# Patient Record
Sex: MALE | Race: BLACK OR AFRICAN AMERICAN
[De-identification: names, ages, dates, MRNs, and addresses within clinical notes are randomized per-mention and may not be internally consistent; named-entity substitution may affect disease eponyms.]

---

## 2020-10-19 ENCOUNTER — HOSPITAL ENCOUNTER (OUTPATIENT)
Dept: HOSPITAL 53 - M RAD | Age: 35
End: 2020-10-19
Attending: PLASTIC SURGERY
Payer: COMMERCIAL

## 2020-10-19 DIAGNOSIS — L90.5: Primary | ICD-10-CM

## 2020-10-19 NOTE — REP
INDICATION:

SCAR CONDITIONS AND FIBROSIS OF SKIN.



COMPARISON:

None



TECHNIQUE:

Real-time sonographic evaluation of bilateral retroareolar regions is performed.



FINDINGS:

No cystic or solid nodule is seen bilaterally in the retroareolar regions.  Soft

tissues appear unremarkable.





IMPRESSION:

BIRADS/ACR category 1.  Negative bilateral retroareolar ultrasound.



RECOMMENDATION:

Clinical correlation and follow-up recommended.



<Electronically signed by Jelani Hernández > 10/19/20 [id number]

## 2021-05-25 ENCOUNTER — HOSPITAL ENCOUNTER (OUTPATIENT)
Dept: HOSPITAL 53 - M SDC | Age: 36
Discharge: HOME | End: 2021-05-25
Attending: PLASTIC SURGERY
Payer: COMMERCIAL

## 2021-05-25 VITALS — BODY MASS INDEX: 34.96 KG/M2 | WEIGHT: 272.4 LBS | HEIGHT: 74 IN

## 2021-05-25 VITALS — SYSTOLIC BLOOD PRESSURE: 128 MMHG | DIASTOLIC BLOOD PRESSURE: 62 MMHG

## 2021-05-25 DIAGNOSIS — L90.5: Primary | ICD-10-CM

## 2021-05-25 DIAGNOSIS — I10: ICD-10-CM

## 2021-05-25 DIAGNOSIS — D64.9: ICD-10-CM

## 2021-05-25 PROCEDURE — 88302 TISSUE EXAM BY PATHOLOGIST: CPT

## 2021-05-25 PROCEDURE — 13100 CMPLX RPR TRUNK 1.1-2.5 CM: CPT

## 2021-05-25 NOTE — ROOPDOC
[facility] Report Of Operation


Report of Operation


DATE OF PROCEDURE: 5/25/21





PREOPERATIVE DIAGNOSIS: right breast periareolar scar





POSTOPERATIVE DIAGNOSIS: same





FINDINGS: Right breast periareolar scar 





PROCEDURE: Right side periareolar scar revision right breast.





SURGEON: Dr Pena





ANESTHESIA: General





SPECIMENS: Right breast scar





ESTIMATED BLOOD LOSS: 3 cc





REPLACED: none





DRAINS: none





COMPLICATIONS: none





POSTOPERATIVE CONDITION: stable





This is a 35 year-old male with symptomatic scar on the right side of 

periareolar incision on right breast,  status post subcutaneous mastectomy for 

gynecomastia. He was treated conservatively but did not achieve desired effect 

with the right breast scar. According to patient he had delayed healing with 

local infection in that area. Periareolar scar is creating a nipple contraction 

and pain, therefore he scheduled for right breast scar revision.  Risks, 

benefits, and alternatives discussed with the patient. He is ready to proceed. 


After obtaining informed consent patient brought into the operating room, placed

in supine position, perioperative antibiotics given, sequential stockings placed

in the lower calves, general anesthesia induced. He was prepped and draped in 

the usual sterile fashion.


1% lidocaine with epinephrine was infiltrated in the area of the scar on the 

right side. Incision carried out around the scar, and lateral right side of the 

nipple-areolar complex. Extensive amount of thick scar was encountered, resected

with use of electrocautery. Retracted part of the breast was released.  Scar 

tissue measures 4.5 cm in length. Wound was irrigated. Normal subcutaneous 

tissue identified. Local subcutaneous flap created to obliterate empty space, 

incision closed in layers with 3-0 Vycril, 3-0 and 4-0 Monocryl sutures and 5-0 

Monocryl suture for periareolar scar. 


Prinio dressing applied to incision.


Patient extubated in operating room, tolerated procedure well, and transferred 

to recovery room in stable condition.











DONOVAN PENA DO              May 25, 2021 10:17

## 2021-05-25 NOTE — POST-OPPD
Postoperative Procedure Note


Date Of Procedure:  May 25, 2021


PREOPERATIVE DIAGNOSIS: right breast periareolar scar





POSTOPERATIVE DIAGNOSIS: same





FINDINGS: Right breast periareolar scar 





PROCEDURE: Right side periareolar scar revision right breast.





SURGEON: Dr Pena





ANESTHESIA: General





SPECIMENS: Right breast scar





ESTIMATED BLOOD LOSS: 3 cc





REPLACED: none





DRAINS: none





COMPLICATIONS: none





POSTOPERATIVE CONDITION: stable











DONOVAN PENA DO              May 25, 2021 10:16